# Patient Record
(demographics unavailable — no encounter records)

---

## 2024-10-24 NOTE — PLAN
[TextEntry] : Readdressed hemorrhoid management  Recommended trial of Reflux gourmet (Alginate therapy)  PPI daily - increase to BID as needed if symptoms persist despite above.  If symptoms not improving may consider repeat endoscopic evaluation +/- Colorectal surgery management  Follow up in 2-3 months in office

## 2024-10-24 NOTE — HISTORY OF PRESENT ILLNESS
[FreeTextEntry1] : Mr. Ashraf is a 40-year-old gentleman referred for evaluation of chronic gastrointestinal symptoms.  Patient reports longstanding history of chronic GERD demonstrated by pH probe monitor.  He has also had persistent nausea however denies episodes of vomiting or regurgitation.  Patient denies any weight loss.  Patient also describes intermittent episodes of bright red blood per rectum as well as black stools.  Patient has a history of internal hemorrhoids status post banding with colorectal surgery previously followed by Dr. Machado.  Patient reports having COVID approximately 6-8 weeks ago, previously was in his usual state of health, however since recovery from COVID has had intermittent rectal bleeding which at times can be enough blood to fill the bowl, described as though it were like " menses".  Patient denies frequent constipation, reportedly using the restroom at least twice per day.  Unclear whether consistency of stools soft entirely with each bowel movement.  Patient has undergone upper endoscopy and colonoscopy in the past.  Colonoscopy significant for polyps.  Patient denies any alarming features. Patient denies any known family history of gastrointestinal malignancy. Not currently on any cardiac or diabetes medications. Patient endorses feeling at their baseline level of health without acute concerns with the exception of above in office today.   Colonoscopy: April 2022 Small grade 1 internal hemorrhoids.  Nonbleeding. Upper endoscopy: April 2022 Grade A esophagitis.  Small hiatal hernia.  Mild gastric erythema consistent with nonerosive gastropathy. History of H. pylori, repeat eradication testing negative.

## 2024-10-24 NOTE — HISTORY OF PRESENT ILLNESS
[FreeTextEntry1] : Mr. Asrhaf is a 40-year-old gentleman referred for evaluation of chronic gastrointestinal symptoms.  Patient reports longstanding history of chronic GERD demonstrated by pH probe monitor.  He has also had persistent nausea however denies episodes of vomiting or regurgitation.  Patient denies any weight loss.  Patient also describes intermittent episodes of bright red blood per rectum as well as black stools.  Patient has a history of internal hemorrhoids status post banding with colorectal surgery previously followed by Dr. Machado.  Patient reports having COVID approximately 6-8 weeks ago, previously was in his usual state of health, however since recovery from COVID has had intermittent rectal bleeding which at times can be enough blood to fill the bowl, described as though it were like " menses".  Patient denies frequent constipation, reportedly using the restroom at least twice per day.  Unclear whether consistency of stools soft entirely with each bowel movement.  Patient has undergone upper endoscopy and colonoscopy in the past.  Colonoscopy significant for polyps.  Patient denies any alarming features. Patient denies any known family history of gastrointestinal malignancy. Not currently on any cardiac or diabetes medications. Patient endorses feeling at their baseline level of health without acute concerns with the exception of above in office today.   Colonoscopy: April 2022 Small grade 1 internal hemorrhoids.  Nonbleeding. Upper endoscopy: April 2022 Grade A esophagitis.  Small hiatal hernia.  Mild gastric erythema consistent with nonerosive gastropathy. History of H. pylori, repeat eradication testing negative.

## 2024-10-24 NOTE — ASSESSMENT
[FreeTextEntry1] : Mr. Ashraf is a 40-year-old gentleman referred for evaluation of chronic gastrointestinal symptoms.  Based on description and history suspect rectal bleeding likely in the setting of internal hemorrhoids. Hemoglobin remains stable. Upper gastrointestinal symptoms likely related to known chronic reflux, possibly undertreated given evidence of esophagitis despite reflux therapy.  Given lack of alarming features and recent endoscopic evaluation would defer repeat endoscopy for now.  If patient's symptoms persist or rectal bleeding recurs would consider repeat evaluation at that time.  Additionally, if hemorrhoids recurred and severe causing recurring bleeding may need to follow-up with colorectal surgery regarding potential intervention.

## 2024-10-24 NOTE — PHYSICAL EXAM
[Alert] : alert [Normal Voice/Communication] : normal voice/communication [Healthy Appearing] : healthy appearing [No Acute Distress] : no acute distress [Sclera] : the sclera and conjunctiva were normal [Hearing Threshold Finger Rub Not [place]] : hearing was normal [Oropharynx] : the oropharynx was normal [Normal Appearance] : the appearance of the neck was normal [No Respiratory Distress] : no respiratory distress [No Acc Muscle Use] : no accessory muscle use [Respiration, Rhythm And Depth] : normal respiratory rhythm and effort [Heart Rate And Rhythm] : heart rate was normal and rhythm regular [None] : no edema [Abdomen Tenderness] : non-tender [No Masses] : no abdominal mass palpated [Abdomen Soft] : soft [] : no hepatosplenomegaly [Abnormal Walk] : normal gait [Oriented To Time, Place, And Person] : oriented to person, place, and time

## 2024-11-19 NOTE — REASON FOR VISIT
[Follow-Up] : a follow-up visit [Asthma] : asthma [Sleep Apnea] : sleep apnea [Obesity] : obesity [TextBox_44] : Prior Covid infection.

## 2024-11-19 NOTE — RESULTS/DATA
[TextEntry] : HST from 9/21/23 revealed moderate ELMER with an AHI of 20.1. CXR from 12/8/23 was clear. CXR from 3/5/24 with bibasilar atelectasis/scarring. CXR from 6/5/24 was clear - reviewed with patient. The patient's overall compliance is % with a >4hr compliance of 67-80% on autoCPAP with a median and max pressure of 7.2-8.2 and 10.3-11.2 cm of water, respectively with a residual AHI of 2.7-3.8 which is normal. Changed pressure to 6-11 cm of water for comfort.

## 2024-11-19 NOTE — PROCEDURE
[FreeTextEntry1] : Tim 9/14/23 - Normal FVC and mildly reduced FEV1 with obstruction. PFTs 12/19/23 - Normal FVC and mildly reduced FEV1 with borderline obstruction but normal lung volumes and DLCO; slightly improved from prior off Breo 100. [place] 2/23/24 - Normal FVC and mildly reduced FEV1 with obstruction and slightly reduced from prior despite Breo 100. Tim 6/20/24 - Normal FVC and mildly reduced FEV1 with obstruction; no change on Breo 100 so will increase to Breo 200. FeNO not covered

## 2024-11-19 NOTE — PHYSICAL EXAM
[No Acute Distress] : no acute distress [Low Lying Soft Palate] : low lying soft palate [Enlarged Base of the Tongue] : enlarged base of the tongue [II] : Mallampati Class: II [Normal Appearance] : normal appearance [Supple] : supple [Normal Rate/Rhythm] : normal rate/rhythm [Normal S1, S2] : normal s1, s2 [No Murmurs] : no murmurs [No Resp Distress] : no resp distress [No Acc Muscle Use] : no acc muscle use [Normal Rhythm and Effort] : normal rhythm and effort [Clear to Auscultation Bilaterally] : clear to auscultation bilaterally [No Abnormalities] : no abnormalities [Benign] : benign [Not Tender] : not tender [Soft] : soft [No Clubbing] : no clubbing [No Edema] : no edema [No Focal Deficits] : no focal deficits [Oriented x3] : oriented x3 [TextBox_11] : Mild to moderate oropharyngeal crowding.

## 2024-11-19 NOTE — DISCUSSION/SUMMARY
[FreeTextEntry1] : #1. Tim performed previously revealed mild obstruction. PFTs again with obstructive pattern though slightly improved from previous though stopped taking Breo. Pt was back on Breo 100 with Stage II COPD and now even better on Breo 200 so will continue this dose. #2. Continue Albuterol as needed and Montelukast nightly. Pt did not obtain allergist evaluation and nor CBC and IgE levels but reports he is better. Consider these labs if needed to determine if he may be a candidate for Xolair or Dupixent. #3. SOBOE is likely at least somewhat related to weight as well. #4. Diet and exercise for weight loss.  #5. CXR to evaluate SOBOE and asthma was clear on 6/5/24. #6. Continue autoCPAP to treat moderate ELMER with an AHI of 20.1; encouraged at least 70% compliance. #7. Replace PAP equipment as needed; ordered 12/19/23. The patient is using and benefitting from CPAP therapy though reports h/a and chest discomfort with therapy at times. Changed pressure to 6-11 cm of water and is better. #8. Pt had both Covid vaccines and boosters; s/p Covid infection. #9. Flonase nasal spray for postnasal drip as needed. S/p ENT evaluation but symptoms thought to be related to allergies. #10. F/u with allergist for possible allergy shots. #11. GI evaluation for GERD and HH. #12. F/u in 3 months with compliance and consider labs if needed.  The patient expressed understanding and agreement with the above recommendations/plan and accepts responsibility to be compliant with recommended testing, therapies, and f/u visits. All relevant questions and concerns were addressed.

## 2024-11-19 NOTE — CONSULT LETTER
[Dear  ___] : Dear  [unfilled], [Consult Letter:] : I had the pleasure of evaluating your patient, [unfilled]. [Please see my note below.] : Please see my note below. [Consult Closing:] : Thank you very much for allowing me to participate in the care of this patient.  If you have any questions, please do not hesitate to contact me. [Sincerely,] : Sincerely, [FreeTextEntry3] : Rogelio Dawson MD, FCCP, D. [facility] Pul[facility] Sleep [facility] at [place]

## 2024-11-19 NOTE — REVIEW OF SYSTEMS
[Fatigue] : fatigue [Postnasal Drip] : postnasal drip [SOB on Exertion] : sob on exertion [Seasonal Allergies] : seasonal allergies [GERD] : gerd [Obesity] : obesity [Negative] : Endocrine [TextBox_69] : On PPI

## 2024-11-19 NOTE — END OF VISIT
[Time Spent: ___ minutes] : I have spent [unfilled] minutes of time on the encounter which excludes teaching and separately reported services. [TextEntry] : Discussed with pt at length regarding ELMER, obesity, asthma, soboe, prior Covid infection; reviewed w/u with pt as above.

## 2024-11-19 NOTE — HISTORY OF PRESENT ILLNESS
[Mild Persistent] : mild persistent [Dyspnea on Exertion] : dyspnea on exertion [Inhaled Short-Acting Beta-2 Agonists] : inhaled short-acting beta-2 agonists [Leukotriene Modifiers] : leukotriene modifiers [Good Compliance] : good compliance with treatment [Good Tolerance] : good tolerance of treatment [Good Symptom Control] : good symptom control [Excess Weight] : excess weight [Dyspnea] : dyspnea [Low Calorie Diet] : low calorie diet [Exercise Regimen] : exercise regimen [Fair Compliance] : fair compliance with treatment [Fair Tolerance] : fair tolerance of treatment [Fair Symptom Control] : fair symptom control [High] : high [Low Calorie] : low calorie [Well Balanced Diet] : well balanced meals [Daily] : exercises daily [Aerobic Conditioning] : aerobic conditioning [Strength Training] : strength training [Follow-Up - Routine Clinic] : a routine clinic follow-up of [Snoring] : snoring [Currently Experiencing] : The patient is currently experiencing symptoms. [None] : The patient is not currently being treated for this problem [TextBox_4] : Never smoker. H/o covid infection 12/2020 with fever, sob, body aches. Pt went to [facility] ER and was given inhaler, steroids, and pulse ox. Pt reports h/o asthma as a child but worse since 9/11 as his school was in [facility] and reports worsening of symptoms since then but now slowly worsening. He is maintained on Albuterol daily and Montelukast nightly. He admits to PNDS, allergies and GERD (on PPI). Reportedly with hiatal hernia. Pt to obtain GI evaluation for possible endoscopy and ENT evaluation for PNDS. No new respiratory complaints despite some reduction in spirometry which may be related to weight gain though pt reports he is now exercising regularly. Pt with recent URI and CXR with new scarring vs atelectasis but without infiltrates. Pt now better. He had stopped using PAP therapy but has resumed. He c/o chest discomfort and h/a when he uses machine but does feel he is sleeping better.  [ESS] : 1 [Witnessed Apnea During Sleep] : no witnessed apnea during sleep [Witnessed Gasping During Sleep] : no witnessed gasping during sleep [Unrefreshing Sleep] : no unrefreshing sleep

## 2025-07-22 NOTE — CONSULT LETTER
[Dear  ___] : Dear  [unfilled], [Consult Letter:] : I had the pleasure of evaluating your patient, [unfilled]. [Please see my note below.] : Please see my note below. [Consult Closing:] : Thank you very much for allowing me to participate in the care of this patient.  If you have any questions, please do not hesitate to contact me. [Sincerely,] : Sincerely, [FreeTextEntry3] : Rogelio Dawson MD, FCCP, D. [facility] Pul[facility] Sleep [facility] at [place]  89

## 2025-07-22 NOTE — DISCUSSION/SUMMARY
[FreeTextEntry1] : #1. Anisha performed previously revealed mild obstruction. PFTs again with obstructive pattern though slightly improved from previous though stopped taking Breo. Pt was back on Breo 100 with Stage II COPD and now even better on Breo 200 so will continue this dose though now feels well. He rarely uses Breo but will use his Albuterol as needed. #2. Continue Albuterol as needed and Montelukast nightly. Pt did not obtain allergist evaluation and nor CBC and IgE levels but reports he is better. Consider these labs if needed to determine if he may be a candidate for Xolair or Dupixent. #3. SOBOE is likely at least somewhat related to weight as well. #4. Diet and exercise for weight loss.  #5. CXR to evaluate SOBOE and asthma was clear on 6/5/24. #6. Continue autoCPAP to treat moderate ELMER with an AHI of 20.1; encouraged at least 70% compliance. #7. Replace PAP equipment as needed; ordered 7/22/25. The patient is using and benefitting from CPAP therapy though reports h/a and chest discomfort with therapy at times. Changed pressure to 6-11 cm of water and is better. #8. Pt had both Covid vaccines and boosters; s/p Covid infection. #9. Flonase nasal spray for postnasal drip as needed. S/p ENT evaluation but symptoms thought to be related to allergies. #10. F/u with allergist for possible allergy shots. #11. GI evaluation for GERD and HH. #12. F/u in 4-6 months with compliance and anisha and consider labs if needed.  The patient expressed understanding and agreement with the above recommendations/plan and accepts responsibility to be compliant with recommended testing, therapies, and f/u visits. All relevant questions and concerns were addressed.

## 2025-07-22 NOTE — RESULTS/DATA
[TextEntry] : HST from 9/21/23 revealed moderate ELMER with an AHI of 20.1. CXR from 12/8/23 was clear. CXR from 3/5/24 with bibasilar atelectasis/scarring. CXR from 6/5/24 was clear. The patient's overall compliance is % with a >4hr compliance of 67-80% on autoCPAP with a median and max pressure of 7.2-8.2 and 9.7-11.2 cm of water, respectively with a residual AHI of 2.3-3.8 which is normal. Pt improved with the change in pressure to 6-11 cm of water for comfort.

## 2025-07-22 NOTE — HISTORY OF PRESENT ILLNESS
[Mild Persistent] : mild persistent [Dyspnea on Exertion] : dyspnea on exertion [Inhaled Short-Acting Beta-2 Agonists] : inhaled short-acting beta-2 agonists [Leukotriene Modifiers] : leukotriene modifiers [Good Compliance] : good compliance with treatment [Good Tolerance] : good tolerance of treatment [Good Symptom Control] : good symptom control [Excess Weight] : excess weight [Dyspnea] : dyspnea [Low Calorie Diet] : low calorie diet [Exercise Regimen] : exercise regimen [Fair Compliance] : fair compliance with treatment [Fair Tolerance] : fair tolerance of treatment [Fair Symptom Control] : fair symptom control [High] : high [Low Calorie] : low calorie [Well Balanced Diet] : well balanced meals [Daily] : exercises daily [Aerobic Conditioning] : aerobic conditioning [Strength Training] : strength training [Follow-Up - Routine Clinic] : a routine clinic follow-up of [Snoring] : snoring [Currently Experiencing] : The patient is currently experiencing symptoms. [None] : The patient is not currently being treated for this problem [TextBox_4] : Never smoker. H/o covid infection 12/2020 with fever, sob, body aches. Pt went to [facility] ER and was given inhaler, steroids, and pulse ox. Pt reports h/o asthma as a child but worse since 9/11 as his school was in [facility] and reports worsening of symptoms since then but now slowly worsening. He is maintained on Albuterol daily and Montelukast nightly. He admits to PNDS, allergies and GERD (on PPI). Reportedly with hiatal hernia. Pt to obtain GI evaluation for possible endoscopy and ENT evaluation for PNDS. No new respiratory complaints despite some reduction in spirometry which may be related to weight gain though pt reports he is now exercising regularly. Pt with recent URI and CXR with new scarring vs atelectasis but without infiltrates. Pt now better. He had stopped using PAP therapy but has resumed. He c/o chest discomfort and h/a when he uses machine but does feel he is sleeping better.  [ESS] : 1 [Witnessed Apnea During Sleep] : no witnessed apnea during sleep [Witnessed Gasping During Sleep] : no witnessed gasping during sleep [Unrefreshing Sleep] : no unrefreshing sleep

## 2025-07-22 NOTE — PROCEDURE
[FreeTextEntry1] : Tim 9/14/23 - Normal FVC and mildly reduced FEV1 with obstruction. PFTs 12/19/23 - Normal FVC and mildly reduced FEV1 with borderline obstruction but normal lung volumes and DLCO; slightly improved from prior off Breo 100. [place] 2/23/24 - Normal FVC and mildly reduced FEV1 with obstruction and slightly reduced from prior despite Breo 100. Tim 6/20/24 - Normal FVC and mildly reduced FEV1 with obstruction; no change on Breo 100 so will increase to Breo 200. FeNO testing is not covered. Tim from 11/19/24 with normal FVC and mildly reduced FEV1 with obstruction. Not compliant with Breo 200. Only using Albuterol as needed.